# Patient Record
Sex: FEMALE | Race: ASIAN | NOT HISPANIC OR LATINO | URBAN - METROPOLITAN AREA
[De-identification: names, ages, dates, MRNs, and addresses within clinical notes are randomized per-mention and may not be internally consistent; named-entity substitution may affect disease eponyms.]

---

## 2020-12-31 ENCOUNTER — OFFICE VISIT (OUTPATIENT)
Dept: URBAN - METROPOLITAN AREA CLINIC 37 | Facility: CLINIC | Age: 75
End: 2020-12-31

## 2020-12-31 VITALS — WEIGHT: 123 LBS | HEIGHT: 59 IN | BODY MASS INDEX: 24.8 KG/M2

## 2020-12-31 PROBLEM — 59621000: Status: ACTIVE | Noted: 2020-12-31

## 2020-12-31 PROBLEM — 313436004: Status: ACTIVE | Noted: 2020-12-31

## 2020-12-31 PROBLEM — 90446007: Status: ACTIVE | Noted: 2020-12-31

## 2020-12-31 PROBLEM — 710815001: Status: ACTIVE | Noted: 2020-12-31

## 2020-12-31 RX ORDER — ATORVASTATIN CALCIUM 20 MG/1
1 TABLET TABLET, FILM COATED ORAL
Status: ACTIVE | COMMUNITY

## 2020-12-31 RX ORDER — LOSARTAN POTASSIUM 100 MG/1
1 TABLET TABLET ORAL
Status: ACTIVE | COMMUNITY

## 2020-12-31 RX ORDER — METFORMIN HYDROCHLORIDE 1000 MG/1
1 TABLET WITH A MEAL TABLET ORAL BID
Status: ACTIVE | COMMUNITY

## 2020-12-31 RX ORDER — SODIUM SULFATE, POTASSIUM SULFATE, MAGNESIUM SULFATE 17.5; 3.13; 1.6 G/ML; G/ML; G/ML
AS DIRECTED SOLUTION, CONCENTRATE ORAL ONCE
Qty: 1 KIT | Refills: 0 | OUTPATIENT
Start: 2020-12-31

## 2020-12-31 NOTE — HPI-MIGRATED HPI
Initial consultation : Patient is here for -> Abdominal pain  Onset of symptoms: 4  weeks ago Location:  LLQ abdominal pain, occurred after each meals She was referred by PCP to GI for further evaluation  She has one BM daily   Associated symptoms:  anal itch   Relieving factors: Medications tried: none Tests/evaluations done previously: Denies any endoscopic tests ;

## 2021-01-02 ENCOUNTER — LAB OUTSIDE AN ENCOUNTER (OUTPATIENT)
Dept: URBAN - METROPOLITAN AREA CLINIC 37 | Facility: CLINIC | Age: 76
End: 2021-01-02

## 2021-01-13 ENCOUNTER — OFFICE VISIT (OUTPATIENT)
Dept: URBAN - METROPOLITAN AREA SURGERY CENTER 8 | Facility: SURGERY CENTER | Age: 76
End: 2021-01-13

## 2021-01-28 ENCOUNTER — OFFICE VISIT (OUTPATIENT)
Dept: URBAN - METROPOLITAN AREA CLINIC 37 | Facility: CLINIC | Age: 76
End: 2021-01-28

## 2021-01-28 VITALS — HEIGHT: 59 IN | BODY MASS INDEX: 24.8 KG/M2 | WEIGHT: 123 LBS

## 2021-01-28 RX ORDER — METFORMIN HYDROCHLORIDE 1000 MG/1
1 TABLET WITH A MEAL TABLET ORAL BID
Status: ACTIVE | COMMUNITY

## 2021-01-28 RX ORDER — SODIUM SULFATE, POTASSIUM SULFATE, MAGNESIUM SULFATE 17.5; 3.13; 1.6 G/ML; G/ML; G/ML
AS DIRECTED SOLUTION, CONCENTRATE ORAL ONCE
Qty: 1 KIT | Refills: 0 | Status: ON HOLD | COMMUNITY
Start: 2020-12-31

## 2021-01-28 RX ORDER — ATORVASTATIN CALCIUM 20 MG/1
1 TABLET TABLET, FILM COATED ORAL
Status: ACTIVE | COMMUNITY

## 2021-01-28 RX ORDER — LOSARTAN POTASSIUM 100 MG/1
1 TABLET TABLET ORAL
Status: ACTIVE | COMMUNITY

## 2021-01-28 NOTE — HPI-MIGRATED HPI
Post-op OV : After Colonoscopy on -> 01/13/2021, there was stool in the entire colon, no large polyps or mass was found. Non-bleeding grade II internal and external hemorrhoids were found during retroflexion but not banded. Poor quality bowel prep She reported that she had followed instruction of Clear liquid diet the days before. She took bowel prep as instructed ( both times)   ;   Post-op OV : Patient denies -> rectal bleeding, fever, nausea & vomiting since the procedure date;   Post-op OV : Patient -> denies any new changes in his/her health status since last OV. Current BM: varied , can be 1 or twice;

## 2021-02-16 ENCOUNTER — OFFICE VISIT (OUTPATIENT)
Dept: URBAN - METROPOLITAN AREA CLINIC 35 | Facility: CLINIC | Age: 76
End: 2021-02-16

## 2021-02-16 VITALS — WEIGHT: 123 LBS | HEIGHT: 59 IN | BODY MASS INDEX: 24.8 KG/M2

## 2021-02-16 PROBLEM — 21522001: Status: ACTIVE | Noted: 2021-02-15

## 2021-02-16 PROBLEM — 305058001: Status: ACTIVE | Noted: 2020-12-31

## 2021-02-16 PROBLEM — 721704005: Status: ACTIVE | Noted: 2021-01-27

## 2021-02-16 RX ORDER — ATORVASTATIN CALCIUM 20 MG/1
1 TABLET TABLET, FILM COATED ORAL
COMMUNITY
End: 2021-02-01

## 2021-02-16 RX ORDER — METFORMIN HYDROCHLORIDE 1000 MG/1
1 TABLET WITH A MEAL TABLET ORAL BID
COMMUNITY
End: 2021-02-01

## 2021-02-16 RX ORDER — PANTOPRAZOLE SODIUM 40 MG/1
1 TABLET 30 MINUTES BEFORE BREAKFAST TABLET, DELAYED RELEASE ORAL ONCE A DAY
Qty: 30 TABLET | Refills: 2 | OUTPATIENT
Start: 2021-02-16

## 2021-02-16 RX ORDER — LOSARTAN POTASSIUM 100 MG/1
1 TABLET TABLET ORAL
COMMUNITY
End: 2021-02-01

## 2021-02-16 RX ORDER — DOCUSATE SODIUM 100 MG/1
2 CAPSULES CAPSULE ORAL ONCE A DAY
Qty: 60 CAPSULE | Refills: 2 | OUTPATIENT
Start: 2021-02-16

## 2021-02-16 NOTE — HPI-MIGRATED HPI
Acute visit : Patient is here for an acute visit -> Abdominal pain Patient was seen 12/2020 for initial consultation due to LLQ abdominal pain and c/o parasite in her anus Subsequently had a Colonoscopy in 01/2021, with poor bowel prep. Therefore, it was unclear etiology for abdominal pain Patient is here due to recurrent abdominal pain Location: left side at hip line. However, patient has a trouble to locate the specific pain location when she insisted that she already known the parasite has " bit her stomach" She reports pain tends to relieve with foods  Medications tried: none Current BM: at least one BM daily As noted, at last visit she denied having constipation but she had poor bowel prep with stool found throughout the colon and with LLQ pain persistently   Previous test/evaluation done: Colonoscopy on 01/13/2021, there was stool in the entire colon, no large polyps or mass was found. Non-bleeding grade II internal and external hemorrhoids were found during retroflexion but not banded. Poor quality bowel prep ;

## 2021-03-30 ENCOUNTER — OFFICE VISIT (OUTPATIENT)
Dept: URBAN - METROPOLITAN AREA CLINIC 35 | Facility: CLINIC | Age: 76
End: 2021-03-30

## 2021-03-30 ENCOUNTER — LAB OUTSIDE AN ENCOUNTER (OUTPATIENT)
Dept: URBAN - METROPOLITAN AREA CLINIC 35 | Facility: CLINIC | Age: 76
End: 2021-03-30

## 2021-03-30 VITALS — BODY MASS INDEX: 24.8 KG/M2 | HEIGHT: 59 IN | WEIGHT: 123 LBS

## 2021-03-30 RX ORDER — DOCUSATE SODIUM 100 MG/1
2 CAPSULES CAPSULE ORAL ONCE A DAY
Qty: 60 CAPSULE | Refills: 2 | Status: ACTIVE | COMMUNITY
Start: 2021-02-16

## 2021-03-30 RX ORDER — DOCUSATE SODIUM 100 MG/1
2 CAPSULES CAPSULE ORAL ONCE A DAY
Qty: 60 CAPSULE | Refills: 2 | OUTPATIENT

## 2021-03-30 RX ORDER — PANTOPRAZOLE SODIUM 40 MG/1
1 TABLET 30 MINUTES BEFORE BREAKFAST TABLET, DELAYED RELEASE ORAL ONCE A DAY
Qty: 30 TABLET | Refills: 2 | Status: ACTIVE | COMMUNITY
Start: 2021-02-16

## 2021-03-30 RX ORDER — ATORVASTATIN CALCIUM 20 MG/1
1 TABLET TABLET, FILM COATED ORAL ONCE A DAY
Qty: 30 | Status: ACTIVE | COMMUNITY

## 2021-03-30 RX ORDER — GABAPENTIN 300 MG/1
1 CAPSULE CAPSULE ORAL ONCE A DAY
Qty: 30 | Status: ACTIVE | COMMUNITY

## 2021-03-30 RX ORDER — GLIPIZIDE 10 MG/1
1 TABLET 30 MINUTES BEFORE BREAKFAST TABLET ORAL ONCE A DAY
Qty: 30 | Status: ACTIVE | COMMUNITY

## 2021-03-30 RX ORDER — LOSARTAN POTASSIUM 100 MG/1
1 TABLET TABLET ORAL ONCE A DAY
Qty: 30 | Status: ACTIVE | COMMUNITY

## 2021-03-30 RX ORDER — PANTOPRAZOLE SODIUM 40 MG/1
1 TABLET 30 MINUTES BEFORE BREAKFAST TABLET, DELAYED RELEASE ORAL ONCE A DAY
Qty: 30 TABLET | Refills: 2 | OUTPATIENT

## 2021-03-30 NOTE — HPI-MIGRATED HPI
Follow up OV : Patient is on -> Colace 200 mg daily + Pantoprazole 40 mg daily;   Follow up OV : Current symptoms are -> persistent with   Current BM: twice  a day ;   Follow up OV : Patient is here for a routine OV for -> Abdominal pain ;   Follow up OV : Last OV was -> 6 weeks ago Colonoscopy done on 01/13/2021, due to abdominal pain. There was stool in the entire colon, no large polyps or mass was found. Poor quality bowel prep ;

## 2021-04-21 ENCOUNTER — OFFICE VISIT (OUTPATIENT)
Dept: URBAN - METROPOLITAN AREA SURGERY CENTER 8 | Facility: SURGERY CENTER | Age: 76
End: 2021-04-21

## 2021-05-14 PROBLEM — 4556007: Status: ACTIVE | Noted: 2021-05-14

## 2021-05-18 ENCOUNTER — OFFICE VISIT (OUTPATIENT)
Dept: URBAN - METROPOLITAN AREA CLINIC 37 | Facility: CLINIC | Age: 76
End: 2021-05-18

## 2021-05-18 ENCOUNTER — DASHBOARD ENCOUNTERS (OUTPATIENT)
Age: 76
End: 2021-05-18

## 2021-05-18 VITALS
DIASTOLIC BLOOD PRESSURE: 72 MMHG | SYSTOLIC BLOOD PRESSURE: 116 MMHG | OXYGEN SATURATION: 96 % | TEMPERATURE: 97.6 F | HEIGHT: 59 IN | WEIGHT: 122 LBS | BODY MASS INDEX: 24.6 KG/M2 | HEART RATE: 67 BPM

## 2021-05-18 RX ORDER — LOSARTAN POTASSIUM 100 MG/1
1 TABLET TABLET ORAL ONCE A DAY
Qty: 30 | Status: ACTIVE | COMMUNITY

## 2021-05-18 RX ORDER — GLIPIZIDE 10 MG/1
1 TABLET 30 MINUTES BEFORE BREAKFAST TABLET ORAL ONCE A DAY
Qty: 30 | Status: ACTIVE | COMMUNITY

## 2021-05-18 RX ORDER — GABAPENTIN 300 MG/1
1 CAPSULE CAPSULE ORAL ONCE A DAY
Qty: 30 | Status: ACTIVE | COMMUNITY

## 2021-05-18 RX ORDER — DOCUSATE SODIUM 100 MG/1
2 CAPSULES CAPSULE ORAL ONCE A DAY
Qty: 60 CAPSULE | Refills: 6

## 2021-05-18 RX ORDER — DOCUSATE SODIUM 100 MG/1
2 CAPSULES CAPSULE ORAL ONCE A DAY
Qty: 60 CAPSULE | Refills: 2 | Status: ACTIVE | COMMUNITY

## 2021-05-18 RX ORDER — PANTOPRAZOLE SODIUM 40 MG/1
1 TABLET 30 MINUTES BEFORE BREAKFAST TABLET, DELAYED RELEASE ORAL ONCE A DAY
Qty: 30 TABLET | Refills: 2 | Status: ACTIVE | COMMUNITY

## 2021-05-18 RX ORDER — ATORVASTATIN CALCIUM 20 MG/1
1 TABLET TABLET, FILM COATED ORAL ONCE A DAY
Qty: 30 | Status: ACTIVE | COMMUNITY

## 2021-05-18 RX ORDER — PANTOPRAZOLE SODIUM 20 MG/1
1 TABLET TABLET, DELAYED RELEASE ORAL ONCE A DAY
Qty: 30 | Refills: 6 | OUTPATIENT
Start: 2021-05-18

## 2021-05-18 NOTE — HPI-MIGRATED HPI
Post-op OV : Patient denies -> rectal bleeding, fever, nausea & vomiting since the procedure date;   Post-op OV : Patient -> denies any new changes in his/her health status since last OV.  Patient continues taking Pantoprazole 40 mg daily + Colace 200 mg daily;   Post-op OV : Patient reports of current symptoms -> ??;   Post-op OV : After EGD on -> 04/21/2021, done due to heartburn and LLQ abdominal pain. The esophagus was normal with distal and proximal esophageal bxx showing mild nonspecific reactive changes, negative for IM. The GE junction was irregular. Erythematous mucosa was found in the gastric antrum and body. Gastric body bxx showed minimal chronic inflammation and gastric antrum bxx showed active gastritis with associated moderate reactive/regenerative changes. No evidence of H. pylori or IM. Normal duodenum;

## 2021-05-18 NOTE — EXAM-MIGRATED EXAMINATIONS
GENERAL APPEARANCE: - alert, in no acute distress, well developed, well nourished ;   EYES: - sclera anicteric bilaterally ;   NECK/THYROID: - neck supple, no thyromegaly;   SKIN: - warm and dry, no  jaundice;   HEART: - no murmurs, regular rate and rhythm, S1, S2 normal ;   LUNGS: - clear to auscultation bilaterally, good air movement, no wheezes, rales, rhonchi ;   ABDOMEN: - bowel sounds present, no masses palpable, no organomegaly , no rebound tenderness, soft, nontender, nondistended ;   RECTAL: - deferred by patient ;   MUSCULOSKELETAL: - normal gait ;   EXTREMITIES: - no edema ;   NEUROLOGIC: - cranial nerves 2-12 grossly intact ;   DENTAL EXAM: -   ;   BMI not documented -   ;   Weight Assessment -   ;

## 2022-01-18 ENCOUNTER — OFFICE VISIT (OUTPATIENT)
Dept: URBAN - METROPOLITAN AREA CLINIC 37 | Facility: CLINIC | Age: 77
End: 2022-01-18